# Patient Record
Sex: MALE | Race: WHITE
[De-identification: names, ages, dates, MRNs, and addresses within clinical notes are randomized per-mention and may not be internally consistent; named-entity substitution may affect disease eponyms.]

---
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
## 2022-05-20 NOTE — ED STATDOCS - OBJECTIVE STATEMENT
20 y/o male with PMHx of asthma, concussion presents to the ED c/o constant diffuse abd pain with associated N/V x3 days. Pain is non radiating. Patient reports he was seen in the ED yesterday with same symptoms,. had blood work, CXR that were negative and told to f/u with PMD.  Pt did not have CT done at that time. Patient was called by PMD today, told patient to come to the ED due to "high WBC".  Pt has been unable to tolerate any PO intake, tried to take Zofran today but was unable to tolerate it. Denies any past abd surgeries, sick contacts, CP, SOB, diarrhea, testicular pain, urinary symptoms, fever or chills. Non smoker.
Home
Hydroxychloroquine Counseling:  I discussed with the patient that a baseline ophthalmologic exam is needed at the start of therapy and every year thereafter while on therapy. A CBC may also be warranted for monitoring.  The side effects of this medication were discussed with the patient, including but not limited to agranulocytosis, aplastic anemia, seizures, rashes, retinopathy, and liver toxicity. Patient instructed to call the office should any adverse effect occur.  The patient verbalized understanding of the proper use and possible adverse effects of Plaquenil.  All the patient's questions and concerns were addressed.

## 2022-05-26 ENCOUNTER — EMERGENCY (EMERGENCY)
Facility: HOSPITAL | Age: 22
LOS: 0 days | Discharge: HOME | End: 2022-05-26
Attending: EMERGENCY MEDICINE
Payer: COMMERCIAL

## 2022-05-26 VITALS
DIASTOLIC BLOOD PRESSURE: 99 MMHG | HEART RATE: 92 BPM | SYSTOLIC BLOOD PRESSURE: 156 MMHG | TEMPERATURE: 98 F | RESPIRATION RATE: 18 BRPM | OXYGEN SATURATION: 98 %

## 2022-05-26 VITALS
HEART RATE: 100 BPM | SYSTOLIC BLOOD PRESSURE: 144 MMHG | DIASTOLIC BLOOD PRESSURE: 63 MMHG | OXYGEN SATURATION: 100 % | RESPIRATION RATE: 18 BRPM

## 2022-05-26 DIAGNOSIS — R11.2 NAUSEA WITH VOMITING, UNSPECIFIED: ICD-10-CM

## 2022-05-26 DIAGNOSIS — J45.909 UNSPECIFIED ASTHMA, UNCOMPLICATED: ICD-10-CM

## 2022-05-26 DIAGNOSIS — K29.70 GASTRITIS, UNSPECIFIED, WITHOUT BLEEDING: ICD-10-CM

## 2022-05-26 DIAGNOSIS — E86.0 DEHYDRATION: ICD-10-CM

## 2022-05-26 LAB
ALBUMIN SERPL ELPH-MCNC: 4.9 G/DL — SIGNIFICANT CHANGE UP (ref 3.3–5)
ALP SERPL-CCNC: 68 U/L — SIGNIFICANT CHANGE UP (ref 40–120)
ALT FLD-CCNC: 33 U/L — SIGNIFICANT CHANGE UP (ref 12–78)
ANION GAP SERPL CALC-SCNC: 12 MMOL/L — SIGNIFICANT CHANGE UP (ref 5–17)
APPEARANCE UR: ABNORMAL
APTT BLD: 25.1 SEC — LOW (ref 27.5–35.5)
AST SERPL-CCNC: 21 U/L — SIGNIFICANT CHANGE UP (ref 15–37)
BASOPHILS # BLD AUTO: 0.04 K/UL — SIGNIFICANT CHANGE UP (ref 0–0.2)
BASOPHILS NFR BLD AUTO: 0.3 % — SIGNIFICANT CHANGE UP (ref 0–2)
BILIRUB SERPL-MCNC: 1.1 MG/DL — SIGNIFICANT CHANGE UP (ref 0.2–1.2)
BILIRUB UR-MCNC: NEGATIVE — SIGNIFICANT CHANGE UP
BUN SERPL-MCNC: 15 MG/DL — SIGNIFICANT CHANGE UP (ref 7–23)
CALCIUM SERPL-MCNC: 10.2 MG/DL — HIGH (ref 8.5–10.1)
CHLORIDE SERPL-SCNC: 102 MMOL/L — SIGNIFICANT CHANGE UP (ref 96–108)
CO2 SERPL-SCNC: 26 MMOL/L — SIGNIFICANT CHANGE UP (ref 22–31)
COLOR SPEC: ABNORMAL
CREAT SERPL-MCNC: 1 MG/DL — SIGNIFICANT CHANGE UP (ref 0.5–1.3)
DIFF PNL FLD: ABNORMAL
EGFR: 110 ML/MIN/1.73M2 — SIGNIFICANT CHANGE UP
EOSINOPHIL # BLD AUTO: 0.01 K/UL — SIGNIFICANT CHANGE UP (ref 0–0.5)
EOSINOPHIL NFR BLD AUTO: 0.1 % — SIGNIFICANT CHANGE UP (ref 0–6)
GLUCOSE SERPL-MCNC: 107 MG/DL — HIGH (ref 70–99)
GLUCOSE UR QL: NEGATIVE — SIGNIFICANT CHANGE UP
HCT VFR BLD CALC: 48.4 % — SIGNIFICANT CHANGE UP (ref 39–50)
HGB BLD-MCNC: 16.6 G/DL — SIGNIFICANT CHANGE UP (ref 13–17)
IMM GRANULOCYTES NFR BLD AUTO: 0.5 % — SIGNIFICANT CHANGE UP (ref 0–1.5)
INR BLD: 1.21 RATIO — HIGH (ref 0.88–1.16)
KETONES UR-MCNC: ABNORMAL
LEUKOCYTE ESTERASE UR-ACNC: NEGATIVE — SIGNIFICANT CHANGE UP
LIDOCAIN IGE QN: 62 U/L — LOW (ref 73–393)
LYMPHOCYTES # BLD AUTO: 0.74 K/UL — LOW (ref 1–3.3)
LYMPHOCYTES # BLD AUTO: 5.1 % — LOW (ref 13–44)
MCHC RBC-ENTMCNC: 29.5 PG — SIGNIFICANT CHANGE UP (ref 27–34)
MCHC RBC-ENTMCNC: 34.3 GM/DL — SIGNIFICANT CHANGE UP (ref 32–36)
MCV RBC AUTO: 86 FL — SIGNIFICANT CHANGE UP (ref 80–100)
MONOCYTES # BLD AUTO: 0.66 K/UL — SIGNIFICANT CHANGE UP (ref 0–0.9)
MONOCYTES NFR BLD AUTO: 4.5 % — SIGNIFICANT CHANGE UP (ref 2–14)
NEUTROPHILS # BLD AUTO: 13.07 K/UL — HIGH (ref 1.8–7.4)
NEUTROPHILS NFR BLD AUTO: 89.5 % — HIGH (ref 43–77)
NITRITE UR-MCNC: NEGATIVE — SIGNIFICANT CHANGE UP
PCP SPEC-MCNC: SIGNIFICANT CHANGE UP
PH UR: 6 — SIGNIFICANT CHANGE UP (ref 5–8)
PLATELET # BLD AUTO: 307 K/UL — SIGNIFICANT CHANGE UP (ref 150–400)
POTASSIUM SERPL-MCNC: 3.6 MMOL/L — SIGNIFICANT CHANGE UP (ref 3.5–5.3)
POTASSIUM SERPL-SCNC: 3.6 MMOL/L — SIGNIFICANT CHANGE UP (ref 3.5–5.3)
PROT SERPL-MCNC: 8.6 GM/DL — HIGH (ref 6–8.3)
PROT UR-MCNC: 30 MG/DL
PROTHROM AB SERPL-ACNC: 14.1 SEC — HIGH (ref 10.5–13.4)
RBC # BLD: 5.63 M/UL — SIGNIFICANT CHANGE UP (ref 4.2–5.8)
RBC # FLD: 13 % — SIGNIFICANT CHANGE UP (ref 10.3–14.5)
SODIUM SERPL-SCNC: 140 MMOL/L — SIGNIFICANT CHANGE UP (ref 135–145)
SP GR SPEC: 1.02 — SIGNIFICANT CHANGE UP (ref 1.01–1.02)
UROBILINOGEN FLD QL: 4
WBC # BLD: 14.59 K/UL — HIGH (ref 3.8–10.5)
WBC # FLD AUTO: 14.59 K/UL — HIGH (ref 3.8–10.5)

## 2022-05-26 PROCEDURE — 80307 DRUG TEST PRSMV CHEM ANLYZR: CPT

## 2022-05-26 PROCEDURE — C9113: CPT

## 2022-05-26 PROCEDURE — 85025 COMPLETE CBC W/AUTO DIFF WBC: CPT

## 2022-05-26 PROCEDURE — 99285 EMERGENCY DEPT VISIT HI MDM: CPT

## 2022-05-26 PROCEDURE — 85730 THROMBOPLASTIN TIME PARTIAL: CPT

## 2022-05-26 PROCEDURE — 80053 COMPREHEN METABOLIC PANEL: CPT

## 2022-05-26 PROCEDURE — 96374 THER/PROPH/DIAG INJ IV PUSH: CPT

## 2022-05-26 PROCEDURE — 96375 TX/PRO/DX INJ NEW DRUG ADDON: CPT

## 2022-05-26 PROCEDURE — 81001 URINALYSIS AUTO W/SCOPE: CPT

## 2022-05-26 PROCEDURE — 83690 ASSAY OF LIPASE: CPT

## 2022-05-26 PROCEDURE — 99284 EMERGENCY DEPT VISIT MOD MDM: CPT | Mod: 25

## 2022-05-26 PROCEDURE — 85610 PROTHROMBIN TIME: CPT

## 2022-05-26 PROCEDURE — 36415 COLL VENOUS BLD VENIPUNCTURE: CPT

## 2022-05-26 RX ORDER — PANTOPRAZOLE SODIUM 20 MG/1
40 TABLET, DELAYED RELEASE ORAL ONCE
Refills: 0 | Status: COMPLETED | OUTPATIENT
Start: 2022-05-26 | End: 2022-05-26

## 2022-05-26 RX ORDER — ONDANSETRON 8 MG/1
1 TABLET, FILM COATED ORAL
Qty: 30 | Refills: 0
Start: 2022-05-26

## 2022-05-26 RX ORDER — ONDANSETRON 8 MG/1
4 TABLET, FILM COATED ORAL ONCE
Refills: 0 | Status: COMPLETED | OUTPATIENT
Start: 2022-05-26 | End: 2022-05-26

## 2022-05-26 RX ORDER — SODIUM CHLORIDE 9 MG/ML
1000 INJECTION INTRAMUSCULAR; INTRAVENOUS; SUBCUTANEOUS ONCE
Refills: 0 | Status: COMPLETED | OUTPATIENT
Start: 2022-05-26 | End: 2022-05-26

## 2022-05-26 RX ADMIN — ONDANSETRON 4 MILLIGRAM(S): 8 TABLET, FILM COATED ORAL at 12:23

## 2022-05-26 RX ADMIN — SODIUM CHLORIDE 1000 MILLILITER(S): 9 INJECTION INTRAMUSCULAR; INTRAVENOUS; SUBCUTANEOUS at 12:25

## 2022-05-26 RX ADMIN — Medication 0.5 MILLIGRAM(S): at 15:57

## 2022-05-26 RX ADMIN — PANTOPRAZOLE SODIUM 40 MILLIGRAM(S): 20 TABLET, DELAYED RELEASE ORAL at 12:23

## 2022-05-26 RX ADMIN — ONDANSETRON 4 MILLIGRAM(S): 8 TABLET, FILM COATED ORAL at 15:57

## 2022-05-26 NOTE — ED STATDOCS - PHYSICAL EXAMINATION
PA NOTE: GEN: AOX3, NAD. HEENT: Throat clear. Airway is patent. EYES: PERRLA. EOMI. Head: NC/AT. NECK: Supple, No JVD. FROM. C-spine non-tender. CV:S1S2, RRR, LUNGS: Non-labored breathing, no tachypnea. O2sat 100% RA. CTA b/l. No w/r/r. CHEST: Equal chest expansion and rise. No deformity. ABD: Soft, NT/ND, no rebound, no guarding. No CVAT. EXT: No e/c/c. 2+ distal pulses. SKIN: No rashes. NEURO: No focal deficits. CN II-XII intact. FROM. 5/5 motor and sensory. ~Ludwin Viveros PA-C

## 2022-05-26 NOTE — ED STATDOCS - CLINICAL SUMMARY MEDICAL DECISION MAKING FREE TEXT BOX
23 y/o male with n/v. Pt had similar episodes in the past. Likely related to marijuana use. Will check labs, IV fluids, Zofran. 23 y/o male with n/v. Pt had similar episodes in the past. Likely related to marijuana use. Will check labs, IV fluids, Zofran.    PA note: All labwork/radiology results discussed in detail with patient. Patient re-examined and re-evaluated. Patient feels much better at this time. ED evaluation, Diagnosis and management discussed with the patient in detail. Workup results discussed with ED attending, OK to [place] home. Close PMD follow up encouraged, aftercare to assist with scheduling appointment ASAP. Strict ED return instructions discussed in detail and patient given the opportunity to ask any questions about their discharge diagnosis and instructions. Patient verbalized understanding. ~ Ludwin Viveros PA-C

## 2022-05-26 NOTE — ED STATDOCS - PATIENT PORTAL LINK FT
You can access the FollowMyHealth Patient Portal offered by [facility] by registering at the following website: [email or web address]. By joining [employer]’s FollowMyHealth portal, you will also be able to view your health information using other applications (apps) compatible with our system.

## 2022-05-26 NOTE — ED ADULT TRIAGE NOTE - CHIEF COMPLAINT QUOTE
Pt comes to the ED complaining of abdominal pain, nausea and vomiting since returning from [place] on Monday. Pt states that he has had coffee ground emesis. Pt unable to keep food or drink down.

## 2022-05-26 NOTE — ED ADULT NURSE NOTE - OBJECTIVE STATEMENT
pt arrives to ED complaining of abdominal pain. pt states he recently traveled back from [place]. complains of abdominal pain with nausea and vomiting. denies medical history.

## 2022-05-26 NOTE — ED ADULT NURSE NOTE - NSIMPLEMENTINTERV_GEN_ALL_ED
Implemented All Universal Safety Interventions:  [place] to call system. Call bell, personal items and telephone within reach. Instruct patient to call for assistance. Room bathroom lighting operational. Non-slip footwear when patient is off stretcher. Physically safe environment: no spills, clutter or unnecessary equipment. Stretcher in lowest position, wheels locked, appropriate side rails in place.

## 2022-05-26 NOTE — ED STATDOCS - CARE PLAN
Principal Discharge DX:	Gastritis  Secondary Diagnosis:	Vomiting  Secondary Diagnosis:	Mild dehydration   1

## 2022-05-26 NOTE — ED STATDOCS - NS ED ATTENDING STATEMENT MOD
This was a shared visit with the PRISCILLA. I reviewed and verified the documentation and independently performed the documented:

## 2022-05-26 NOTE — ED STATDOCS - ATTENDING APP SHARED VISIT CONTRIBUTION OF CARE
I,John Ledesma MD,  performed the initial face to face bedside interview with this patient regarding history of present illness, review of symptoms and relevant past medical, social and family history.  I completed an independent physical examination.  I was the initial provider who evaluated this patient. I have signed out the follow up of any pending tests (i.e. labs, radiological studies) to the ACP.  I have communicated the patient’s plan of care and disposition with the ACP.  The history, relevant review of systems, past medical and surgical history, medical decision making, and physical examination was documented by the scribe in my presence and I attest to the accuracy of the documentation.

## 2022-05-26 NOTE — ED STATDOCS - INTERNATIONAL TRAVEL
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

## 2022-05-26 NOTE — ED STATDOCS - NSICDXFAMILYHX_GEN_ALL_CORE_FT
FAMILY HISTORY:  Mother  Still living? Unknown  Family history of kidney stone, Age at diagnosis: Age Unknown

## 2022-05-26 NOTE — ED STATDOCS - OBJECTIVE STATEMENT
21 y/o male with PMHx of asthma, concussion presents to the ED c/o abd pain and n/v x days. Pt had similar episodes in the past. Current marijuana user. NKDA. No other complaints at this time.

## 2022-05-26 NOTE — ED STATDOCS - NSFOLLOWUPINSTRUCTIONS_ED_ALL_ED_FT
Gastritis, Adult       Gastritis is inflammation of the stomach. There are two kinds of gastritis:  •Acute gastritis. This kind develops suddenly.      •Chronic gastritis. This kind is much more common and lasts for a long time.      Gastritis happens when the lining of the stomach becomes weak or gets damaged. Without treatment, gastritis can lead to stomach bleeding and ulcers.      What are the causes?    This condition may be caused by:  •An infection.      •Drinking too much alcohol.      •Certain medicines. These include steroids, antibiotics, and some over-the-counter medicines, such as aspirin or ibuprofen.      •Having too much acid in the stomach.      •A disease of the intestines or stomach.      •Stress.      •An allergic reaction.      •Crohn's disease.      •Some cancer treatments (radiation).      Sometimes the cause of this condition is not known.      What are the signs or symptoms?    Symptoms of this condition include:  •Pain or a burning sensation in the upper abdomen.      •Nausea.      •Vomiting.      •An uncomfortable feeling of fullness after eating.      •Weight loss.      •Bad breath.      •Blood in your vomit or stools.      In some cases, there are no symptoms.      How is this diagnosed?    This condition may be diagnosed with:  •Your medical history and a description of your symptoms.      •A physical exam.    •Tests. These can include:  •Blood tests.      •Stool tests.      •A test in which a thin, flexible instrument with a light and a camera is passed down the esophagus and into the stomach (upper endoscopy).      •A test in which a sample of tissue is taken for testing (biopsy).          How is this treated?    This condition may be treated with medicines. The medicines that are used vary depending on the cause of the gastritis:  •If the condition is caused by a bacterial infection, you may be given antibiotic medicines.      •If the condition is caused by too much acid in the stomach, you may be given medicines called H2 blockers, proton pump inhibitors, or antacids.      Treatment may also involve stopping the use of certain medicines, such as aspirin, ibuprofen, or other NSAIDs.      Follow these instructions at home:    Medicines     •Take over-the-counter and prescription medicines only as told by your health care provider.      •If you were prescribed an antibiotic medicine, take it as told by your health care provider. Do not stop taking the antibiotic even if you start to feel better.        Eating and drinking      •Eat small, frequent meals instead of large meals.      •Avoid foods and drinks that make your symptoms worse.      •Drink enough fluid to keep your urine pale yellow.      Alcohol use   • Do not drink alcohol if:  •Your health care provider tells you not to drink.      •You are pregnant, may be pregnant, or are planning to become pregnant.      •If you drink alcohol:•Limit your use to:  •0–1 drink a day for women.      •0–2 drinks a day for men.        •Be aware of how much alcohol is in your drink. In the [place] one drink equals one 12 oz bottle of beer (355 mL), one 5 oz glass of wine (148 mL), or one 1½ oz glass of hard liquor (44 mL).        General instructions     •Talk with your health care provider about ways to manage stress, such as getting regular exercise or practicing deep breathing, meditation, or yoga.      • Do not use any products that contain nicotine or tobacco, such as cigarettes and e-cigarettes. If you need help quitting, ask your health care provider.      •Keep all follow-up visits as told by your health care provider. This is important.        Contact a health care provider if:    •Your symptoms get worse.      •Your symptoms return after treatment.        Get help right away if:    •You vomit blood or material that looks like coffee grounds.      •You have black or dark red stools.      •You are unable to keep fluids down.      •Your abdominal pain gets worse.      •You have a fever.      •You do not feel better after one week.        Summary    •Gastritis is inflammation of the lining of the stomach that can occur suddenly (acute) or develop slowly over time (chronic).      •This condition is diagnosed with a medical history, a physical exam, or tests.      •This condition may be treated with medicines to treat infection or medicines to reduce the amount of acid in your stomach.      •Follow your health care provider's instructions about taking medicines, making changes to your diet, and knowing when to call for help.      This information is not intended to replace advice given to you by your health care provider. Make sure you discuss any questions you have with your health care provider.            Vomiting, Adult      Vomiting occurs when stomach contents are thrown up and out of the mouth. Many people notice nausea before vomiting. Vomiting can make you feel weak and cause you to become dehydrated. Dehydration can make you feel tired and thirsty, cause you to have a dry mouth, and decrease how often you urinate. Older adults and people who have other diseases or a weak body defense system (immune system) are at higher risk for dehydration. It is important to treat vomiting as told by your health care provider.      Follow these instructions at home:       Eating and drinking                   Follow these recommendations as told by your health care provider:  •Take an oral rehydration solution (ORS). This is a drink that is sold at pharmacies and retail stores.      •Eat bland, easy-to-digest foods in small amounts as you are able. These foods include bananas, applesauce, rice, lean meats, toast, and crackers.      •Drink clear fluids slowly and in small amounts as you are able. Clear fluids include water, ice chips, low-calorie sports drinks, and fruit juice that has water added (diluted fruit juice).      •Avoid drinking fluids that contain a lot of sugar or caffeine, such as energy drinks, sports drinks, and soda.      •Avoid alcohol.      •Avoid spicy or fatty foods.      General instructions     •Wash your hands often using soap and water. If soap and water are not available, use hand . Make sure that everyone in your household washes their hands frequently.      •Take over-the-counter and prescription medicines only as told by your health care provider.      •Rest at home while you recover.      •Watch your condition for any changes.      •Keep all follow-up visits as told by your health care provider. This is important.        Contact a health care provider if:    •Your vomiting gets worse.      •You have new symptoms.      •You have a fever.      •You cannot drink fluids without vomiting.      •You feel light-headed or dizzy.      •You have a headache.      •You have muscle cramps.      •You have a rash.      •You have pain while urinating.        Get help right away if:    •You have pain in your chest, neck, arm, or jaw.      •You feel extremely weak or you faint.      •You have persistent vomiting.      •You have vomit that is bright red or looks like black coffee grounds.      •You have stools that are bloody or black, or stools that look like tar.      •You have a severe headache, a stiff neck, or both.      •You have severe pain, cramping, or bloating in your abdomen.      •You have trouble breathing or you are breathing very quickly.      •Your heart is beating very quickly.      •Your skin feels cold and clammy.      •You feel confused.    •You have signs of dehydration, such as:  •Dark urine, very little urine, or no urine.      •Cracked lips.      •Dry mouth.      •Sunken eyes.      •Sleepiness.      •Weakness.        These symptoms may represent a serious problem that is an emergency. Do not wait to see if the symptoms will go away. Get medical help right away. Call your local emergency services (911 in the [place] Do not drive yourself to the hospital.       Summary    •Vomiting occurs when stomach contents are thrown up and out of the mouth. Vomiting can cause you to become dehydrated. Older adults and people who have other diseases or a weak immune system are at higher risk for dehydration.      •It is important to treat vomiting as told by your health care provider. Follow your health care provider's instructions about eating and drinking.      •Wash your hands often using soap and water. If soap and water are not available, use hand . Make sure that everyone in your household washes their hands frequently.      •Watch your condition for any changes and for signs of dehydration.      •Keep all follow-up visits as told by your health care provider. This is important.      This information is not intended to replace advice given to you by your health care provider. Make sure you discuss any questions you have with your health care provider.

## 2022-05-26 NOTE — ED STATDOCS - PROGRESS NOTE DETAILS
Patient is feeling a lot better, able to tolerate PO fluids and food. Waiting on urine. ~Ludwin Viveros PA-C PA: Patient is a 21 y/o male with PMHx of asthma, concussion who presents to ED c/o n/v and abd cramps x 2 days. Pt had similar episodes in the past. Current marijuana user. NKDA. No other complaints at this time. ~Ludwin Viveros PA-C PA note: All labwork/radiology results discussed in detail with patient. Patient re-examined and re-evaluated. Patient feels much better at this time. ED evaluation, Diagnosis and management discussed with the patient in detail. Workup results discussed with ED attending, OK to [place] home. Close PMD follow up encouraged, aftercare to assist with scheduling appointment ASAP. Strict ED return instructions discussed in detail and patient given the opportunity to ask any questions about their discharge diagnosis and instructions. Patient verbalized understanding. ~ Ludwin Viveros PA-C

## 2023-10-18 NOTE — ED STATDOCS - NSICDXPASTMEDICALHX_GEN_ALL_CORE_FT
[facility] Medicine Services  HISTORY AND PHYSICAL    Patient Name: Luh Mckeon  : 1928  MRN: [id number]  Primary Care Physician: Schuyler Garcia MD  Date of admission: 10/17/2023      Subjective   Subjective     Chief Complaint:  Fatigue, shortness of breath, edema    HPI:  Luh Mckeon is a 89 y.o. female with a PMH significant for moderate-severe aortic stenosis, atrial fibrillation on Eliquis, dementia who comes to the ED due to fatigue, shortness of breath, edema.  Daughter at bedside provides majority of HPI due to advanced dementia.  Daughter says that patient began feeling poorly this past Thursday when she had a episode of fecal incontinence overnight.  Patient had low-grade fever, fatigue, nausea.  She was seen by her PCP last Friday.  She was checked for flu, COVID and RSV which were all negative.   She began to experience increased lower extremity edema, cough, shortness of breath, and abdominal distention over the weekend.   She was seen again by PCP today, she was found to have low blood pressure in the clinic.  Chest x-ray was obtained outpatient.  She continued to feel poorly this evening, called PCP.  Chest x-ray was reviewed, with concerns for viral pneumonia and hypotension she was told to come to the ED.      Personal History     Past Medical History:   Diagnosis Date    Ankle instability     Chronic left shoulder pain 2018    Disorder of tendon of shoulder region, left     Dysphagia     Esophagitis     Femoral neck fracture 7/15/2021    Finger fracture, right 2009    Dr Chandler casting and physical therapy    Hemorrhoids     Hypertension     Localized, primary osteoarthritis of left shoulder region     Osteoarthritis of right hip 3/20/2023    Paroxysmal atrial fibrillation 2021    Right shoulder pain     Shoulder joint replacement status     Right    Stroke     CVA             Past Surgical History:   Procedure Laterality Date     APPENDECTOMY      Daughter denies    CATARACT EXTRACTION, BILATERAL      2020    COLONOSCOPY N/A 8/23/2021    Procedure: COLONOSCOPY;  Surgeon: Aries Varela MD;  Location:  [facility];  Service: Gastroenterology;  Laterality: N/A;    ENDOSCOPY  07/2009    with biopsy/esophageal ring dilation    ENDOSCOPY N/A 8/23/2021    Procedure: ESOPHAGOGASTRODUODENOSCOPY;  Surgeon: Aries Varela MD;  Location:  [facility];  Service: Gastroenterology;  Laterality: N/A;    HEMORRHOIDECTOMY      lanced     HIP HEMIARTHROPLASTY Right 7/15/2021    Procedure: HIP HEMIARTHROPLASTY;  Surgeon: Adam Olsen MD;  Location:  [facility];  Service: Orthopedics;  Laterality: Right;    HYSTERECTOMY      partial     OOPHORECTOMY      SHOULDER SURGERY Right     replacement    SHOULDER SURGERY      Arthroscopy of shoulder:Right    TONSILLECTOMY         Family History: family history includes Bleeding Disorder in her brother and mother; Breast cancer (age of onset: 60) in her mother; Cancer in her mother and sister; Colon cancer in her brother; Coronary artery disease in her father; Heart attack in her father; Osteoarthritis in her mother; Rheum arthritis in her mother; Stroke in her mother.     Social History:  reports that she has never smoked. She has never used smokeless tobacco. She reports that she does not drink alcohol and does not use drugs.  Social History     Social History Narrative    Not on file       Medications:  Available home medication information reviewed.  Medications Prior to Admission   Medication Sig Dispense Refill Last Dose    acetaminophen (TYLENOL) 325 MG tablet Take 500 mg by mouth As Needed for Mild Pain.   Past Week    apixaban (Eliquis) 2.5 MG tablet tablet Take 1 tablet by mouth Every 12 (Twelve) Hours. 14 tablet 0 10/17/2023    cholecalciferol (VITAMIN D3) 10 MCG (400 UNIT) tablet Take 1 tablet by mouth Every Morning.   10/17/2023    Coenzyme Q10 (EQL CoQ10) 200 MG capsule Take 200 mg by mouth  2 (Two) Times a Day.   10/17/2023    Cranberry 450 MG tablet Take 1 tablet by mouth Daily.   10/17/2023    dilTIAZem CD (CARDIZEM CD) 120 MG 24 hr capsule TAKE 1 CAPSULE DAILY 90 capsule 3 10/17/2023    docusate sodium (COLACE) 100 MG capsule Take 1 capsule by mouth Daily. (Patient taking differently: Take 1 capsule by mouth 2 (Two) Times a Day.) 90 capsule 1 10/17/2023    melatonin 3 MG tablet Take 1 tablet by mouth Every Night.   Past Week    Mirabegron ER (Myrbetriq) 50 MG tablet sustained-release 24 hour 24 hr tablet Take 50 mg by mouth Daily. (Patient taking differently: Take 50 mg by mouth Every Night.) 90 tablet 1 Past Week    omeprazole (priLOSEC) 40 MG capsule TAKE 1 CAPSULE DAILY (Patient taking differently: Take 1 capsule by mouth Every Night.) 90 capsule 3 Past Week    polyethylene glycol (MIRALAX) 17 GM/SCOOP powder Take 17 g by mouth. Once a day on Mon, Wed, Fri and PRN for constipation   10/17/2023    rosuvastatin (CRESTOR) 5 MG tablet TAKE 1 TABLET DAILY (Patient taking differently: Take 1 tablet by mouth Every Night.) 90 tablet 3 Past Week    bisacodyl (DULCOLAX) 10 MG suppository Insert 1 suppository into the rectum Daily As Needed for Constipation.   More than a month    denosumab (Prolia) 60 MG/ML solution prefilled syringe syringe Inject 1 mL under the skin into the appropriate area as directed Every 6 (Six) Months.   More than a month    hydrocortisone (ANUSOL-HC) 25 MG suppository Insert 1 suppository into the rectum 2 (Two) Times a Day As Needed for Hemorrhoids.   More than a month    ondansetron ODT (ZOFRAN-ODT) 8 MG disintegrating tablet Place 1 tablet on the tongue Every 8 (Eight) Hours As Needed for Nausea or Vomiting. 20 tablet 1        No Known Allergies    Objective   Objective     Vital Signs:   Temp:  [98 °F (36.7 °C)-98.8 °F (37.1 °C)] 98.8 °F (37.1 °C)  Heart Rate:  [] 140  Resp:  [20-22] 20  BP: ()/(50-96) 86/74  Flow (L/min):  [2] 2       Physical Exam    Constitutional: Awake, alert  Eyes: PERRLA, sclerae anicteric, no conjunctival injection  HENT: NCAT, mucous membranes moist  Neck: Supple, no thyromegaly, no lymphadenopathy, trachea midline  Respiratory: Diffuse wheezes and inspiratory crackles throughout all lung fields, tachypneic  Cardiovascular: RRR, no murmurs, rubs, or gallops, palpable pedal pulses bilaterally  Gastrointestinal: Positive bowel sounds, soft, nontender, nondistended  Musculoskeletal: 2+ pitting bilateral ankle edema, no clubbing or cyanosis to extremities  Psychiatric: Appropriate affect, cooperative  Neurologic: Oriented x 3, strength symmetric in all extremities, Cranial Nerves grossly intact to confrontation, speech clear  Skin: No rashes      Result Review:  I have personally reviewed the results from the time of this admission to 10/18/2023 03:40 EDT and agree with these findings:  [x]  Laboratory list / accordion  [x]  Microbiology  [x]  Radiology  [x]  EKG/Telemetry   []  Cardiology/Vascular   []  Pathology  [x]  Old records      LAB RESULTS:      Lab 10/17/23  2310 10/17/23  1537   WBC 7.75 7.07   HEMOGLOBIN 14.0 13.4   HEMATOCRIT 43.4 39.4   PLATELETS 307 329   NEUTROS ABS 5.07 4.39   IMMATURE GRANS (ABS) 0.02 0.03   LYMPHS ABS 1.61 1.56   MONOS ABS 0.81 0.84   EOS ABS 0.20 0.21   MCV 95.8 91.6   PROCALCITONIN 0.02  --    LACTATE 1.8  --          Lab 10/17/23  2310 10/17/23  1537   SODIUM 131* 135*   POTASSIUM 4.5 4.1   CHLORIDE 95* 95*   CO2 27.0 26.2   ANION GAP 9.0 13.8   BUN 12 14   CREATININE 0.71 0.87   EGFR 79.9 62.6   GLUCOSE 99 97   CALCIUM 9.6 9.7*   IONIZED CALCIUM  --  1.29  5.2   MAGNESIUM  --  2.3         Lab 10/17/23  2310 10/17/23  1537   TOTAL PROTEIN 7.7 7.2   ALBUMIN 3.6 3.8   GLOBULIN 4.1 3.4   ALT (SGPT) 13 11   AST (SGOT) 24 21   BILIRUBIN 0.2 0.3   ALK PHOS 85 77         Lab 10/18/23  0101 10/17/23  2310 10/17/23  1537   PROBNP  --  5,278.0* 6,344.0*   HSTROP T 150* 171*  --                  UA           3/1/2023    18:53 6/27/2023    13:30   Urinalysis   Squamous Epithelial Cells, UA  0-2    Specific Gravity, UA  1.017    Ketones, UA Negative  Negative    Blood, UA  Trace    Leukocytes, UA Negative  Moderate (2+)    Nitrite, UA  Positive    RBC, UA  None Seen    WBC, UA  21-30    Bacteria, UA  4+        Microbiology Results (last 10 days)       Procedure Component Value - Date/Time    COVID PRE-OP / PRE-PROCEDURE SCREENING ORDER (NO ISOLATION) - Swab, Nasopharynx [[id number]]  (Normal) Collected: 10/17/23 2315    Lab Status: Final result Specimen: Swab from Nasopharynx Updated: 10/18/23 0042    Narrative:      The following orders were created for panel order COVID PRE-OP / PRE-PROCEDURE SCREENING ORDER (NO ISOLATION) - Swab, Nasopharynx.  Procedure                               Abnormality         Status                     ---------                               -----------         ------                     Respiratory Panel PCR w/...[[id number]]  Normal              Final result                 Please view results for these tests on the individual orders.    Respiratory Panel PCR w/COVID-19(SARS-CoV-2) [facility] In-House, NP Swab in UTM/VTM, 3-4 HR TAT - Swab, Nasopharynx [[id number]]  (Normal) Collected: 10/17/23 2315    Lab Status: Final result Specimen: Swab from Nasopharynx Updated: 10/18/23 0042     ADENOVIRUS, PCR Not Detected     Coronavirus 229E Not Detected     Coronavirus HKU1 Not Detected     Coronavirus NL63 Not Detected     Coronavirus OC43 Not Detected     COVID19 Not Detected     Human Metapneumovirus Not Detected     Human Rhinovirus/Enterovirus Not Detected     Influenza A PCR Not Detected     Influenza B PCR Not Detected     Parainfluenza Virus 1 Not Detected     Parainfluenza Virus 2 Not Detected     Parainfluenza Virus 3 Not Detected     Parainfluenza Virus 4 Not Detected     RSV, PCR Not Detected     Bordetella pertussis pcr Not Detected     Bordetella parapertussis PCR Not  Detected     Chlamydophila pneumoniae PCR Not Detected     Mycoplasma pneumo by PCR Not Detected    Narrative:      In the setting of a positive respiratory panel with a viral infection PLUS a negative procalcitonin without other underlying concern for bacterial infection, consider observing off antibiotics or discontinuation of antibiotics and continue supportive care. If the respiratory panel is positive for atypical bacterial infection (Bordetella pertussis, Chlamydophila pneumoniae, or Mycoplasma pneumoniae), consider antibiotic de-escalation to target atypical bacterial infection.            XR Chest 1 View    Result Date: 10/18/2023  XR CHEST 1 VW Date of Exam: 10/18/2023 12:18 AM EDT Indication: CHF exacerbation Comparison: 10/17/2023. Findings: The heart is mildly enlarged, stable as compared to the previous study. There is indistinctness of the pulmonary vasculature with small bilateral pleural effusions, similar as compared to the previous study. Patchy airspace opacities and interstitial opacities present. No pneumothorax. No acute osseous abnormality identified.     Impression: Impression: Mild pulmonary edema pattern with small bilateral pleural effusions, similar as compared to the previous study. Electronically Signed: Marie Kowalski MD  10/18/2023 12:21 AM EDT  Workstation ID: [id number]    XR Chest PA & Lateral    Result Date: 10/17/2023  XR CHEST PA AND LATERAL Date of Exam: 10/17/2023 4:04 PM EDT Indication: shortness of breath and edema Comparison: Chest x-ray 8/22/2021 Findings: Stable cardiomediastinal silhouette with atherosclerosis of the aortic arch and top normal size of the heart. There are new small bilateral pleural effusions. There are diffuse interstitial opacities. No pneumothorax. No acute osseous findings. A right shoulder arthroplasty is noted.     Impression: Impression: There are new small bilateral pleural effusions and new diffuse interstitial opacities, favor interstitial edema  although atypical/viral infection could have a similar appearance in the correct clinical context. Electronically Signed: Kt Smiley MD  10/17/2023 4:37 PM EDT  Workstation ID: [id number]     Results for orders placed during the hospital encounter of 07/15/21    Adult Transthoracic Echo Complete w/ Color, Spectral and Contrast if necessary per protocol    Interpretation Summary  · Estimated left ventricular EF = 60% Left ventricular systolic function is normal.  · Left ventricular wall thickness is consistent with borderline concentric hypertrophy.  · Left ventricular diastolic dysfunction is noted.  · Moderate to severe aortic valve stenosis is present.  · Aortic valve maximum pressure gradient is 59.2 mmHg. Aortic valve mean pressure gradient is 37.5 mmHg.  · Moderate mitral valve regurgitation is present.      Assessment & Plan   Assessment & Plan     Active Hospital Problems    Diagnosis  POA    **Acute exacerbation of CHF (congestive heart failure) [I50.9]  Yes    Hypoxia [R09.02]  Unknown    Moderate to severe aortic stenosis [I35.0]  Unknown    CHF (congestive heart failure) [I50.9]  Yes    Dementia without behavioral disturbance [F03.90]  Yes    Atrial fibrillation with rapid ventricular response [I48.91]  Yes    Benign essential hypertension [I10]  Yes   Luh Mckeon is a 89 y.o. female with a PMH significant for moderate-severe aortic stenosis, atrial fibrillation on Eliquis, dementia who comes to the ED due to fatigue, shortness of breath, edema.      Acute CHF exacerbation  Moderate to severe aortic stenosis  Hypoxia  - Not on home oxygen.  Satting 89% on room air.  Currently maintaining adequate O2 sats on 2 L nasal cannula  --BNP elevated, CXR consistent with CHF  --given IV Bumex 1 mg in ER.  Defer diuresis to cardiology with history of aortic stenosis  --stricts is and os, daily weights  --echo  --Hypotensive, nitro paste removed    Atrial Fibrillation with RVR  -- Given IV metoprolol 2.5  mg, became hypotensive  --Digoxin 250 mcg IV x1 now  --echo  --cardiology consult, follows with Dr. Burroughs  --anticoagulation with Eliquis  --Monitor on telemetry    HTN  - Continue home meds as BP allows, hold parameters placed    Dementia  - Fall precautions  - No active home meds      Total time spent: 75 minutes  Time spent includes time reviewing chart, face-to-face time, counseling patient/family/caregiver, ordering medications/tests/procedures, communicating with other health care professionals, documenting clinical information in the electronic health record, and coordination of care.      DVT prophylaxis: Eliquis      CODE STATUS: Full code  Code Status and Medical Interventions:   Ordered at: 10/18/23 0332     Level Of Support Discussed With:    Patient     Code Status (Patient has no pulse and is not breathing):    CPR (Attempt to Resuscitate)     Medical Interventions (Patient has pulse or is breathing):    Full Support       Expected Discharge   Expected Discharge Date: 10/21/2023; Expected Discharge Time:      Cassie Flores DO  10/18/23     PAST MEDICAL HISTORY:  Asthma     Concussion

## 2023-11-30 NOTE — ED ADULT NURSE REASSESSMENT NOTE - NS ED NURSE REASSESS COMMENT FT1
drinking water, endorses nausea. no vomiting. TEE Bermudez aware.
pt denies n/v after administration of reglan. tolerating gingerale.
None

## 2024-10-17 NOTE — ED ADULT NURSE NOTE - NS ED NURSE RECORD ANOTHER VITAL SIGN
Pt AAOx4, but drowsy. VSS, afebrile. Systolic -150s. Sats stable on RA. Pt returned from PACU around 0600. RLQ incision w/ scant dry drainage, borders marked. RLQ CECILIA draining sanguineous fluid. Arreola draining punch colored urine. Emptying output hourly. I=O maintained. D5 1/2 normal @50 ml/hour. NS @ 125 ml/hour. Fluid intake encouraged, importance of measuring intake discussed w/ wife at bedside. Tele NSR. Given scheduled tylenol for moderate pain. Pt C/O bladder spasms, JOVANI DELGADO made aware, orders received for PO ditropan to begin this AM. Thymo #1 completed. SCDs applied. L arm fistula ++. Reviewed plan of care w/ pt. Remained free from falls/traumas/injuries. Questions answered and encouraged. Call light at bedside, non-skid socks on. Bed in low position, wheels locked. Standard precautions maintained.    Yes

## 2025-07-15 ENCOUNTER — NON-APPOINTMENT (OUTPATIENT)
Age: 25
End: 2025-07-15

## 2025-07-15 ENCOUNTER — EMERGENCY (EMERGENCY)
Facility: HOSPITAL | Age: 25
LOS: 0 days | Discharge: HOME | End: 2025-07-15
Attending: STUDENT IN AN ORGANIZED HEALTH CARE EDUCATION/TRAINING PROGRAM
Payer: COMMERCIAL

## 2025-07-15 VITALS
HEART RATE: 90 BPM | SYSTOLIC BLOOD PRESSURE: 144 MMHG | RESPIRATION RATE: 20 BRPM | TEMPERATURE: 98 F | OXYGEN SATURATION: 97 % | WEIGHT: 214.95 LBS | DIASTOLIC BLOOD PRESSURE: 99 MMHG

## 2025-07-15 VITALS
HEART RATE: 77 BPM | SYSTOLIC BLOOD PRESSURE: 151 MMHG | OXYGEN SATURATION: 100 % | DIASTOLIC BLOOD PRESSURE: 84 MMHG | RESPIRATION RATE: 18 BRPM | TEMPERATURE: 98 F

## 2025-07-15 DIAGNOSIS — K21.9 GASTRO-ESOPHAGEAL REFLUX DISEASE WITHOUT ESOPHAGITIS: ICD-10-CM

## 2025-07-15 DIAGNOSIS — J45.909 UNSPECIFIED ASTHMA, UNCOMPLICATED: ICD-10-CM

## 2025-07-15 DIAGNOSIS — R07.89 OTHER CHEST PAIN: ICD-10-CM

## 2025-07-15 DIAGNOSIS — Z82.49 FAMILY HISTORY OF ISCHEMIC HEART DISEASE AND OTHER DISEASES OF THE CIRCULATORY SYSTEM: ICD-10-CM

## 2025-07-15 LAB
ALBUMIN SERPL ELPH-MCNC: 4.7 G/DL — SIGNIFICANT CHANGE UP (ref 3.3–5)
ALP SERPL-CCNC: 65 U/L — SIGNIFICANT CHANGE UP (ref 40–120)
ALT FLD-CCNC: 30 U/L — SIGNIFICANT CHANGE UP (ref 12–78)
ANION GAP SERPL CALC-SCNC: 5 MMOL/L — SIGNIFICANT CHANGE UP (ref 5–17)
AST SERPL-CCNC: 14 U/L — LOW (ref 15–37)
BASOPHILS # BLD AUTO: 0.04 K/UL — SIGNIFICANT CHANGE UP (ref 0–0.2)
BASOPHILS NFR BLD AUTO: 0.4 % — SIGNIFICANT CHANGE UP (ref 0–2)
BILIRUB SERPL-MCNC: 1 MG/DL — SIGNIFICANT CHANGE UP (ref 0.2–1.2)
BUN SERPL-MCNC: 12 MG/DL — SIGNIFICANT CHANGE UP (ref 7–23)
CALCIUM SERPL-MCNC: 9.8 MG/DL — SIGNIFICANT CHANGE UP (ref 8.5–10.1)
CHLORIDE SERPL-SCNC: 102 MMOL/L — SIGNIFICANT CHANGE UP (ref 96–108)
CO2 SERPL-SCNC: 30 MMOL/L — SIGNIFICANT CHANGE UP (ref 22–31)
CREAT SERPL-MCNC: 1.07 MG/DL — SIGNIFICANT CHANGE UP (ref 0.5–1.3)
D DIMER BLD IA.RAPID-MCNC: <150 NG/ML DDU — SIGNIFICANT CHANGE UP
EGFR: 100 ML/MIN/1.73M2 — SIGNIFICANT CHANGE UP
EGFR: 100 ML/MIN/1.73M2 — SIGNIFICANT CHANGE UP
EOSINOPHIL # BLD AUTO: 0.13 K/UL — SIGNIFICANT CHANGE UP (ref 0–0.5)
EOSINOPHIL NFR BLD AUTO: 1.3 % — SIGNIFICANT CHANGE UP (ref 0–6)
GLUCOSE SERPL-MCNC: 94 MG/DL — SIGNIFICANT CHANGE UP (ref 70–99)
HCT VFR BLD CALC: 50.2 % — HIGH (ref 39–50)
HGB BLD-MCNC: 16.7 G/DL — SIGNIFICANT CHANGE UP (ref 13–17)
IMM GRANULOCYTES # BLD AUTO: 0.02 K/UL — SIGNIFICANT CHANGE UP (ref 0–0.07)
IMM GRANULOCYTES NFR BLD AUTO: 0.2 % — SIGNIFICANT CHANGE UP (ref 0–0.9)
LIDOCAIN IGE QN: 50 U/L — SIGNIFICANT CHANGE UP (ref 13–75)
LYMPHOCYTES # BLD AUTO: 2.47 K/UL — SIGNIFICANT CHANGE UP (ref 1–3.3)
LYMPHOCYTES NFR BLD AUTO: 25.2 % — SIGNIFICANT CHANGE UP (ref 13–44)
MCHC RBC-ENTMCNC: 29.4 PG — SIGNIFICANT CHANGE UP (ref 27–34)
MCHC RBC-ENTMCNC: 33.3 G/DL — SIGNIFICANT CHANGE UP (ref 32–36)
MCV RBC AUTO: 88.4 FL — SIGNIFICANT CHANGE UP (ref 80–100)
MONOCYTES # BLD AUTO: 0.99 K/UL — HIGH (ref 0–0.9)
MONOCYTES NFR BLD AUTO: 10.1 % — SIGNIFICANT CHANGE UP (ref 2–14)
NEUTROPHILS # BLD AUTO: 6.16 K/UL — SIGNIFICANT CHANGE UP (ref 1.8–7.4)
NEUTROPHILS NFR BLD AUTO: 62.8 % — SIGNIFICANT CHANGE UP (ref 43–77)
NRBC # BLD AUTO: 0 K/UL — SIGNIFICANT CHANGE UP (ref 0–0)
NRBC # FLD: 0 K/UL — SIGNIFICANT CHANGE UP (ref 0–0)
NRBC BLD AUTO-RTO: 0 /100 WBCS — SIGNIFICANT CHANGE UP (ref 0–0)
PLATELET # BLD AUTO: 267 K/UL — SIGNIFICANT CHANGE UP (ref 150–400)
PMV BLD: 9.8 FL — SIGNIFICANT CHANGE UP (ref 7–13)
POTASSIUM SERPL-MCNC: 3.9 MMOL/L — SIGNIFICANT CHANGE UP (ref 3.5–5.3)
POTASSIUM SERPL-SCNC: 3.9 MMOL/L — SIGNIFICANT CHANGE UP (ref 3.5–5.3)
PROT SERPL-MCNC: 8.2 GM/DL — SIGNIFICANT CHANGE UP (ref 6–8.3)
RBC # BLD: 5.68 M/UL — SIGNIFICANT CHANGE UP (ref 4.2–5.8)
RBC # FLD: 12.5 % — SIGNIFICANT CHANGE UP (ref 10.3–14.5)
SODIUM SERPL-SCNC: 137 MMOL/L — SIGNIFICANT CHANGE UP (ref 135–145)
TROPONIN I, HIGH SENSITIVITY RESULT: 3.95 NG/L — SIGNIFICANT CHANGE UP
WBC # BLD: 9.81 K/UL — SIGNIFICANT CHANGE UP (ref 3.8–10.5)
WBC # FLD AUTO: 9.81 K/UL — SIGNIFICANT CHANGE UP (ref 3.8–10.5)

## 2025-07-15 PROCEDURE — 99285 EMERGENCY DEPT VISIT HI MDM: CPT | Mod: 25

## 2025-07-15 PROCEDURE — 71046 X-RAY EXAM CHEST 2 VIEWS: CPT

## 2025-07-15 PROCEDURE — 84484 ASSAY OF TROPONIN QUANT: CPT

## 2025-07-15 PROCEDURE — 36415 COLL VENOUS BLD VENIPUNCTURE: CPT

## 2025-07-15 PROCEDURE — 85379 FIBRIN DEGRADATION QUANT: CPT

## 2025-07-15 PROCEDURE — 85025 COMPLETE CBC W/AUTO DIFF WBC: CPT

## 2025-07-15 PROCEDURE — 93005 ELECTROCARDIOGRAM TRACING: CPT

## 2025-07-15 PROCEDURE — 71046 X-RAY EXAM CHEST 2 VIEWS: CPT | Mod: 26

## 2025-07-15 PROCEDURE — 96374 THER/PROPH/DIAG INJ IV PUSH: CPT

## 2025-07-15 PROCEDURE — 99285 EMERGENCY DEPT VISIT HI MDM: CPT

## 2025-07-15 PROCEDURE — 93010 ELECTROCARDIOGRAM REPORT: CPT

## 2025-07-15 PROCEDURE — 83690 ASSAY OF LIPASE: CPT

## 2025-07-15 PROCEDURE — 80053 COMPREHEN METABOLIC PANEL: CPT

## 2025-07-15 RX ORDER — KETOROLAC TROMETHAMINE 30 MG/ML
15 INJECTION, SOLUTION INTRAMUSCULAR; INTRAVENOUS ONCE
Refills: 0 | Status: DISCONTINUED | OUTPATIENT
Start: 2025-07-15 | End: 2025-07-15

## 2025-07-15 RX ADMIN — KETOROLAC TROMETHAMINE 15 MILLIGRAM(S): 30 INJECTION, SOLUTION INTRAMUSCULAR; INTRAVENOUS at 21:41

## 2025-07-15 NOTE — ED ADULT NURSE NOTE - OBJECTIVE STATEMENT
Pt presents to ED c/p left sided pain near rib area and sob upon inhalation x3 days. Pt states he was vomiting on Sunday. Hx depression and asthma. A&Ox4 and ambulatory.

## 2025-07-15 NOTE — ED STATDOCS - CLINICAL SUMMARY MEDICAL DECISION MAKING FREE TEXT BOX
presentation most concerning for chest wall pain, low suspicion for ACS or PE. plan for EKG, labs, pain control, monitor and reassessment.   EKG NSR rate 75, normal axis, normal intervals, no BEULAH or STD (1924) presentation most concerning for chest wall pain, low suspicion for ACS or PE. plan for EKG, labs, pain control, monitor and reassessment.   EKG NSR rate 75, normal axis, normal intervals, no BEULAH or STD (1924). CXR clear. Labs including trop and ddimer WNL. DC in stable condition with f/u instructions and strict return precautions

## 2025-07-15 NOTE — ED STATDOCS - PATIENT PORTAL LINK FT
You can access the FollowMyHealth Patient Portal offered by [facility] by registering at the following website: [email or web address]. By joining [facility]’s FollowMyHealth portal, you will also be able to view your health information using other applications (apps) compatible with our system.

## 2025-07-15 NOTE — ED STATDOCS - OBJECTIVE STATEMENT
25 year old male with PMHx of GERD, asthma presents to ED from  c/o chest wall pain since Sunday. + cough. patient reports that on Sunday he had several episodes of vomiting prior to the onset of the pain. patient states he feels the pain in his chest upon deep inspiration. patient reports some abdominal pain when lying down. denies n/v/d. + familial history of cardiac events, father had history of MI at 46 years. no pain medications taken today. 25 year old male with PMHx of GERD, asthma presents to ED from  c/o chest wall pain since Monday. + cough. patient reports that on Sunday he had several episodes of vomiting prior to the onset of the pain. patient states he feels the pain in his chest upon deep inspiration.  denies fever, chills, SOB, abd pain, back pain, n/v/d, urinary symptoms, leg pain or swelling.  +father had history of MI at 46 years. no pain medications taken today.

## 2025-07-15 NOTE — ED ADULT TRIAGE NOTE - CHIEF COMPLAINT QUOTE
Pt ambulatory c/o L-sided non-radiating CP, SOB x 3 days. Pt sent by . Pt reports he was vomiting on Sunday and had CP since then. Hx of depression, asthma. AO x 4. No meds PTA. Pt taken for STAT EKG.

## 2025-07-15 NOTE — ED STATDOCS - PROGRESS NOTE DETAILS
signed Michelle Fischer PA-C Pt seen and examined initially in intake by Dr. Raya.   25M with chest pain with deep breaths. Pt has been sick since sunday with cough and vomiting, developed chest pain yesterday. family concerned as father has hx of CAD at a young age. No significant findings on labs, imaging, or EKG. pt thinks this is from muscle pain from vomiting. recommend f/u PMD. return precautions given. Pt feeling well, pt and family agree with DC and plan of care.